# Patient Record
Sex: FEMALE | Race: WHITE | NOT HISPANIC OR LATINO | Employment: OTHER | URBAN - METROPOLITAN AREA
[De-identification: names, ages, dates, MRNs, and addresses within clinical notes are randomized per-mention and may not be internally consistent; named-entity substitution may affect disease eponyms.]

---

## 2017-01-12 ENCOUNTER — TELEPHONE (OUTPATIENT)
Dept: CARDIOLOGY | Facility: CLINIC | Age: 77
End: 2017-01-12

## 2017-01-12 NOTE — TELEPHONE ENCOUNTER
Pt is scheduled for RT total knee replacement with Dr. Flowers on 2/22/17. Alisson is requesting surgery clearance with any concerns are precautions. Please advise! Thanks, Jennifer

## 2017-05-31 RX ORDER — POTASSIUM CHLORIDE 750 MG/1
TABLET, EXTENDED RELEASE ORAL
Qty: 90 TABLET | Refills: 3 | Status: SHIPPED | OUTPATIENT
Start: 2017-05-31 | End: 2019-01-25 | Stop reason: ALTCHOICE

## 2017-06-09 ENCOUNTER — OFFICE VISIT (OUTPATIENT)
Dept: CARDIOLOGY | Facility: CLINIC | Age: 77
End: 2017-06-09

## 2017-06-09 VITALS
WEIGHT: 213.4 LBS | DIASTOLIC BLOOD PRESSURE: 78 MMHG | HEIGHT: 66 IN | SYSTOLIC BLOOD PRESSURE: 132 MMHG | BODY MASS INDEX: 34.3 KG/M2 | HEART RATE: 75 BPM

## 2017-06-09 DIAGNOSIS — E87.5 HYPERKALEMIA: ICD-10-CM

## 2017-06-09 DIAGNOSIS — M79.89 LIMB SWELLING: ICD-10-CM

## 2017-06-09 DIAGNOSIS — I49.1 ATRIAL COMPLEX, PREMATURE: Primary | ICD-10-CM

## 2017-06-09 DIAGNOSIS — I10 ESSENTIAL HYPERTENSION: ICD-10-CM

## 2017-06-09 PROCEDURE — 93000 ELECTROCARDIOGRAM COMPLETE: CPT | Performed by: INTERNAL MEDICINE

## 2017-06-09 PROCEDURE — 99213 OFFICE O/P EST LOW 20 MIN: CPT | Performed by: INTERNAL MEDICINE

## 2017-06-09 RX ORDER — DICLOFENAC SODIUM 75 MG/1
75 TABLET, DELAYED RELEASE ORAL 2 TIMES DAILY
COMMUNITY
End: 2018-06-08

## 2017-06-09 NOTE — PROGRESS NOTES
Subjective:     Encounter Date:06/09/2017      Patient ID: Pearl Desouza is a 76 y.o. female.    Chief Complaint:  History of Present Illness      Ms. Desouza is a 76-year-old female with a history of hypertension, hypothyroidism, frequent symptomatic PACs, and chronic lower extremity edema who presents for followup.  I saw the patient initially for an evaluation of palpitations.  A Holter monitor was placed which revealed episodes of sinus tachycardia with frequent PACs but no atrial fibrillation.  She underwent an echocardiogram which revealed normal left ventricular systolic function and wall motion, mild aortic insufficiency and mild tricuspid regurgitation.  We have been managing her palpitations and PACs with metoprolol which has helped greatly with her symptoms.  She has also been on chronic furosemide and potassium for her issues of lower extremity edema.  With weight loss and the use of metoprolol, her palpitations have greatly improved.      During our last visit she was having some issues with high potassium.  She tried cutting back on her potassium dosage but then started having more issues with palpitations.  At that time we decided she should take the potassium every other day.  Since then, she has had a repeat BMP done in 02/2017 which revealed her potassium was well controlled.  This was around the time of her knee replacement.  She reports that she has done well since her knee replacement.      Today she presents for routine followup.  She reports she has been under a lot of stress as she and her  have been having some issues.  They have been working these out and it is improving some.  Her palpitations overall have been pretty stable.  She reports that when she feels stressed or anxious, she will get a feeling in her chest kind of like a burning sensation.  She will usually take an extra potassium and the symptoms resolve.  It does not really seem to improve when she takes another  metoprolol.  She is not too concerned about the symptoms and relates this to her anxiety.  She reports that if she felt it was something concerning to do with her heart she would have notified me.  She otherwise denies any chest pain.  She denies any significant lower extremity edema, PND, orthopnea, presyncope or syncope.  She denies any significant shortness of breath.           Review of Systems   Constitution: Negative for weakness and malaise/fatigue.   HENT: Negative for headaches, hearing loss, hoarse voice, nosebleeds and sore throat.    Eyes: Negative for pain.   Cardiovascular: Positive for palpitations. Negative for chest pain, claudication, cyanosis, dyspnea on exertion, irregular heartbeat, leg swelling, near-syncope, orthopnea, paroxysmal nocturnal dyspnea and syncope.   Respiratory: Negative for shortness of breath and snoring.    Endocrine: Negative for cold intolerance, heat intolerance, polydipsia, polyphagia and polyuria.   Skin: Negative for itching and rash.   Musculoskeletal: Positive for joint pain. Negative for arthritis, falls, joint swelling, muscle cramps, muscle weakness and myalgias.   Gastrointestinal: Negative for constipation, diarrhea, dysphagia, heartburn, hematemesis, hematochezia, melena, nausea and vomiting.   Genitourinary: Negative for frequency, hematuria and hesitancy.   Neurological: Negative for excessive daytime sleepiness, dizziness, light-headedness and numbness.   Psychiatric/Behavioral: Negative for depression. The patient is not nervous/anxious.          Current Outpatient Prescriptions:   •  Ascorbic Acid (VITAMIN C) 500 MG capsule, Take  by mouth Daily., Disp: , Rfl:   •  aspirin 325 MG tablet, Take  by mouth 2 (Two) Times a Day., Disp: , Rfl:   •  B Complex Vitamins (VITAMIN B COMPLEX PO), Take  by mouth Daily., Disp: , Rfl:   •  Calcium Carbonate (CALCIUM 600 PO), Take  by mouth., Disp: , Rfl:   •  Cholecalciferol (VITAMIN D3) 5000 UNITS capsule capsule, Take  5,000 Units by mouth 2 (Two) Times a Day., Disp: , Rfl:   •  Coenzyme Q10 (CO Q-10 PO), Take 100 mg by mouth Daily., Disp: , Rfl:   •  diclofenac (VOLTAREN) 75 MG EC tablet, Take 75 mg by mouth 2 (Two) Times a Day., Disp: , Rfl:   •  DiphenhydrAMINE HCl (BENADRYL PO), Take  by mouth As Needed., Disp: , Rfl:   •  doxycycline (VIBRAMYCIN) 100 MG capsule, Take  by mouth every 12 (twelve) hours., Disp: , Rfl:   •  folic acid (FOLVITE) 1 MG tablet, Take 1 mg by mouth Daily., Disp: , Rfl:   •  furosemide (LASIX) 20 MG tablet, TAKE 1 TABLET BY MOUTH TWICE DAILY, Disp: 180 tablet, Rfl: 0  •  levothyroxine (SYNTHROID) 175 MCG tablet, Take  by mouth. Patietn takes one in the morning and 1/2 every night except for Monday and Thursday, Disp: , Rfl:   •  LUTEIN PO, Take  by mouth., Disp: , Rfl:   •  Magnesium 250 MG tablet, Take  by mouth Daily., Disp: , Rfl:   •  metFORMIN (GLUCOPHAGE) 500 MG tablet, Take  by mouth 2 (two) times a day., Disp: , Rfl:   •  metoprolol tartrate (LOPRESSOR) 25 MG tablet, Take 12.5 mg by mouth 3 (Three) Times a Day., Disp: , Rfl:   •  Misc Natural Products (OSTEO BI-FLEX JOINT SHIELD PO), Take  by mouth., Disp: , Rfl:   •  Multiple Vitamin (MULTI-VITAMIN PO), Take  by mouth Daily., Disp: , Rfl:   •  naproxen (NAPROSYN) 500 MG tablet, Take  by mouth As Needed., Disp: , Rfl:   •  niacinamide 500 MG tablet, Take 400 mg by mouth 2 (Two) Times a Day., Disp: , Rfl:   •  Omega-3 Fatty Acids (FISH OIL) 1000 MG capsule capsule, Take  by mouth every night at bedtime., Disp: , Rfl:   •  potassium chloride (K-DUR) 10 MEQ CR tablet, Take 1 tablet by mouth daily., Disp: 30 tablet, Rfl: 11  •  potassium chloride (K-DUR,KLOR-CON) 10 MEQ CR tablet, Take 1 tablet by mouth  daily, Disp: 90 tablet, Rfl: 3  •  Selenium (SELENIMIN-200 PO), Take  by mouth Daily., Disp: , Rfl:   •  Vitamin A 8000 UNITS tablet, Take  by mouth Daily., Disp: , Rfl:   •  vitamin E 400 UNIT/15ML liquid, Take  by mouth Daily., Disp: , Rfl:   •   "zolpidem (AMBIEN) 10 MG tablet, Take  by mouth every night at bedtime., Disp: , Rfl:     Past Medical History:   Diagnosis Date   • APC (adenomatous polyposis coli)    • Atrial premature complex    • Diabetes mellitus    • Fatigue    • Hypertension    • Hypothyroidism    • Limb swelling    • MVP (mitral valve prolapse)    • PAF (paroxysmal atrial fibrillation)    • Palpitation    • SOB (shortness of breath)      Past Surgical History:   Procedure Laterality Date   • HYSTERECTOMY     • KNEE SURGERY       Family History   Problem Relation Age of Onset   • Atrial fibrillation Mother    • Diabetes Other    • Stroke Neg Hx    • Hypertension Neg Hx      Social History   Substance Use Topics   • Smoking status: Never Smoker   • Smokeless tobacco: None      Comment: caffeine use   • Alcohol use Yes      Comment: Social           ECG 12 Lead  Date/Time: 6/9/2017 12:01 PM  Performed by: PETRA ATKINSON  Authorized by: PETRA ATKINSON   Comparison: compared with previous ECG   Similar to previous ECG  Rhythm: sinus rhythm  Conduction: right bundle branch block               Objective:         Visit Vitals   • /78   • Pulse 75   • Ht 66\" (167.6 cm)   • Wt 213 lb 6.4 oz (96.8 kg)   • BMI 34.44 kg/m2          Physical Exam   Constitutional: She is oriented to person, place, and time. She appears well-developed and well-nourished.   HENT:   Head: Normocephalic and atraumatic.   Eyes: Conjunctivae, EOM and lids are normal. Pupils are equal, round, and reactive to light.   Neck: Normal range of motion and full passive range of motion without pain. Neck supple. No JVD present. Carotid bruit is not present.   Cardiovascular: Normal rate, regular rhythm, S1 normal and S2 normal.  Exam reveals no gallop.    No murmur heard.  Pulses:       Radial pulses are 2+ on the right side, and 2+ on the left side.   No bilateral lower extremity edema   Pulmonary/Chest: Effort normal and breath sounds normal.   Abdominal: Soft. Normal " appearance.   Lymphadenopathy:     She has no cervical adenopathy.   Neurological: She is alert and oriented to person, place, and time.   Skin: Skin is warm, dry and intact.   Psychiatric: She has a normal mood and affect.       Lab Review:       Assessment:          Diagnosis Plan   1. Atrial complex, premature     2. Essential hypertension     3. Limb swelling     4. Hyperkalemia            Plan:        1. Palpitations/frequent PACs. Her symptoms have been pretty well controlled with electrolyte management with her potassium and metoprolol. We will continue the same.   2. Hypertension. Blood pressures look well controlled on her current medication. We will continue the same.   3. Dyslipidemia.     We will plan on seeing the patient back again in 1 year or sooner if any issues arise prior to that.

## 2017-10-16 DIAGNOSIS — M79.89 LIMB SWELLING: ICD-10-CM

## 2017-10-17 RX ORDER — FUROSEMIDE 20 MG/1
TABLET ORAL
Qty: 180 TABLET | Refills: 0 | Status: SHIPPED | OUTPATIENT
Start: 2017-10-17 | End: 2018-01-11 | Stop reason: SDUPTHER

## 2018-01-11 DIAGNOSIS — M79.89 LIMB SWELLING: ICD-10-CM

## 2018-01-11 RX ORDER — FUROSEMIDE 20 MG/1
TABLET ORAL
Qty: 180 TABLET | Refills: 1 | Status: SHIPPED | OUTPATIENT
Start: 2018-01-11 | End: 2018-04-10 | Stop reason: SDUPTHER

## 2018-04-10 DIAGNOSIS — M79.89 LIMB SWELLING: ICD-10-CM

## 2018-04-10 RX ORDER — FUROSEMIDE 20 MG/1
TABLET ORAL
Qty: 180 TABLET | Refills: 0 | Status: SHIPPED | OUTPATIENT
Start: 2018-04-10 | End: 2018-07-25 | Stop reason: SDUPTHER

## 2018-06-08 ENCOUNTER — OFFICE VISIT (OUTPATIENT)
Dept: CARDIOLOGY | Facility: CLINIC | Age: 78
End: 2018-06-08

## 2018-06-08 VITALS
WEIGHT: 215 LBS | SYSTOLIC BLOOD PRESSURE: 140 MMHG | HEIGHT: 66 IN | BODY MASS INDEX: 34.55 KG/M2 | DIASTOLIC BLOOD PRESSURE: 82 MMHG | HEART RATE: 63 BPM

## 2018-06-08 DIAGNOSIS — I10 ESSENTIAL HYPERTENSION: ICD-10-CM

## 2018-06-08 DIAGNOSIS — I49.1 ATRIAL COMPLEX, PREMATURE: Primary | ICD-10-CM

## 2018-06-08 DIAGNOSIS — M79.89 LIMB SWELLING: ICD-10-CM

## 2018-06-08 PROCEDURE — 93000 ELECTROCARDIOGRAM COMPLETE: CPT | Performed by: INTERNAL MEDICINE

## 2018-06-08 PROCEDURE — 99213 OFFICE O/P EST LOW 20 MIN: CPT | Performed by: INTERNAL MEDICINE

## 2018-06-08 NOTE — PROGRESS NOTES
Subjective:     Encounter Date:06/08/2018      Patient ID: Pearl Desouza is a 77 y.o. female.    Chief Complaint:  History of Present Illness    This is a 76-year-old female with a history of hypertension, hypothyroidism, frequent symptomatic PACs,  who presents for followup.      I saw the patient initially for an evaluation of palpitations.  A Holter monitor was placed which revealed episodes of sinus tachycardia with frequent PACs but no atrial fibrillation.  She underwent an echocardiogram which revealed normal left ventricular systolic function and wall motion, mild aortic insufficiency and mild tricuspid regurgitation.  We have been managing her palpitations and PACs with metoprolol which has helped greatly with her symptoms.  She has also been on chronic furosemide and potassium for her issues of lower extremity edema.  With weight loss and the use of metoprolol, her palpitations have greatly improved.       Today she presents for routine linear follow-up.  When I saw her last year she reported some increased issues with anxiety due to strain relationship with her .  The cause some increase in her  symptoms.  At that time however she reported that things were getting better and her symptoms were doing so also.  Today she reports that she is still having some issues with her  that caused her some anxiety and issues of depression.  She reports that he is becoming more paranoid and forgetful lately.  She suspects that he may be in early stages of dementia.  When she is feeling anxious her palpitations will increase and she will often take an additional half tablet of metoprolol tartrate.  Otherwise she's been able to really control her symptoms by taking the metoprolol tartrate twice a day.  She reports episodes when she wakes up in the middle the night feeling like something is wrong but she cannot pinpoint any other symptoms.  She denies any chest pain, feelings of shortness of  breath, or smothering with those episodes.  She does have heart racing and palpitations when she wakes up suddenly.  When these episodes occur she'll take an additional half tablet of metoprolol and sleep in her recliner.  She reports this happens about once or twice a week.  She does not believe this is due to sleep apnea.  He otherwise denies any chest pain, dyspnea on exertion, lower extremity edema, dizziness or lightheadedness.    Review of Systems   Constitution: Negative for weakness and malaise/fatigue.   HENT: Negative for hearing loss, hoarse voice, nosebleeds and sore throat.    Eyes: Negative for pain.   Cardiovascular: Positive for palpitations. Negative for chest pain, claudication, cyanosis, dyspnea on exertion, irregular heartbeat, leg swelling, near-syncope, orthopnea, paroxysmal nocturnal dyspnea and syncope.   Respiratory: Negative for shortness of breath and snoring.    Endocrine: Negative for cold intolerance, heat intolerance, polydipsia, polyphagia and polyuria.   Skin: Negative for itching and rash.   Musculoskeletal: Negative for arthritis, falls, joint pain, joint swelling, muscle cramps, muscle weakness and myalgias.   Gastrointestinal: Negative for constipation, diarrhea, dysphagia, heartburn, hematemesis, hematochezia, melena, nausea and vomiting.   Genitourinary: Negative for frequency, hematuria and hesitancy.   Neurological: Negative for excessive daytime sleepiness, dizziness, headaches, light-headedness and numbness.   Psychiatric/Behavioral: Positive for depression. The patient is nervous/anxious.           Current Outpatient Prescriptions:   •  Ascorbic Acid (VITAMIN C) 500 MG capsule, Take  by mouth Daily., Disp: , Rfl:   •  B Complex Vitamins (VITAMIN B COMPLEX PO), Take  by mouth Daily., Disp: , Rfl:   •  Calcium Carbonate (CALCIUM 600 PO), Take  by mouth., Disp: , Rfl:   •  Cholecalciferol (VITAMIN D3) 5000 UNITS capsule capsule, Take 5,000 Units by mouth 2 (Two) Times a Day.,  Disp: , Rfl:   •  Coenzyme Q10 (CO Q-10 PO), Take 100 mg by mouth Daily., Disp: , Rfl:   •  DiphenhydrAMINE HCl (BENADRYL PO), Take  by mouth As Needed., Disp: , Rfl:   •  doxycycline (VIBRAMYCIN) 100 MG capsule, Take  by mouth every 12 (twelve) hours., Disp: , Rfl:   •  folic acid (FOLVITE) 1 MG tablet, Take 1 mg by mouth Daily., Disp: , Rfl:   •  furosemide (LASIX) 20 MG tablet, TAKE 1 TABLET BY MOUTH TWO  TIMES DAILY, Disp: 180 tablet, Rfl: 0  •  levothyroxine (SYNTHROID) 175 MCG tablet, Take  by mouth. Patietn takes one in the morning and 1/2 every night except for Monday and Thursday, Disp: , Rfl:   •  LUTEIN PO, Take 20 mg by mouth Daily., Disp: , Rfl:   •  Magnesium 250 MG tablet, Take  by mouth Daily., Disp: , Rfl:   •  metFORMIN (GLUCOPHAGE) 500 MG tablet, Take  by mouth 2 (two) times a day., Disp: , Rfl:   •  metoprolol tartrate (LOPRESSOR) 25 MG tablet, TAKE 1/2 TABLET BY MOUTH THREE TIMES DAILY, Disp: 135 tablet, Rfl: 0  •  Misc Natural Products (OSTEO BI-FLEX JOINT SHIELD PO), Take  by mouth., Disp: , Rfl:   •  Multiple Vitamin (MULTI-VITAMIN PO), Take  by mouth Daily., Disp: , Rfl:   •  naproxen (NAPROSYN) 500 MG tablet, Take  by mouth As Needed., Disp: , Rfl:   •  niacinamide 500 MG tablet, Take 400 mg by mouth 2 (Two) Times a Day., Disp: , Rfl:   •  Omega-3 Fatty Acids (FISH OIL) 1000 MG capsule capsule, Take  by mouth every night at bedtime., Disp: , Rfl:   •  potassium chloride (K-DUR) 10 MEQ CR tablet, Take 1 tablet by mouth daily., Disp: 30 tablet, Rfl: 11  •  potassium chloride (K-DUR,KLOR-CON) 10 MEQ CR tablet, Take 1 tablet by mouth  daily, Disp: 90 tablet, Rfl: 3  •  Selenium (SELENIMIN-200 PO), Take  by mouth Daily., Disp: , Rfl:   •  Vitamin A 8000 UNITS tablet, Take  by mouth Daily., Disp: , Rfl:   •  vitamin E 400 UNIT/15ML liquid, Take  by mouth Daily., Disp: , Rfl:   •  zolpidem (AMBIEN) 10 MG tablet, Take  by mouth every night at bedtime., Disp: , Rfl:     Past Medical History:  "  Diagnosis Date   • APC (adenomatous polyposis coli)    • Atrial premature complex    • Diabetes mellitus    • Fatigue    • Hypertension    • Hypothyroidism    • Limb swelling    • MVP (mitral valve prolapse)    • PAF (paroxysmal atrial fibrillation)    • Palpitation    • SOB (shortness of breath)      Past Surgical History:   Procedure Laterality Date   • HYSTERECTOMY     • KNEE SURGERY       Family History   Problem Relation Age of Onset   • Atrial fibrillation Mother    • Diabetes Other    • Stroke Neg Hx    • Hypertension Neg Hx      Social History   Substance Use Topics   • Smoking status: Never Smoker   • Smokeless tobacco: Not on file      Comment: caffeine use   • Alcohol use Yes      Comment: Social           ECG 12 Lead  Date/Time: 6/8/2018 11:13 AM  Performed by: PETRA ATKINSON  Authorized by: PETRA ATKINSON   Comparison: compared with previous ECG   Similar to previous ECG  Rhythm: sinus rhythm  Conduction: right bundle branch block and LAFB               Objective:         Visit Vitals  /82   Pulse 63   Ht 167.6 cm (66\")   Wt 97.5 kg (215 lb)   BMI 34.70 kg/m²          Physical Exam   Constitutional: She is oriented to person, place, and time. She appears well-developed and well-nourished.   HENT:   Head: Normocephalic and atraumatic.   Eyes: Conjunctivae, EOM and lids are normal. Pupils are equal, round, and reactive to light.   Neck: Normal range of motion and full passive range of motion without pain. Neck supple. No JVD present. Carotid bruit is not present.   Cardiovascular: Normal rate, regular rhythm, S1 normal and S2 normal.  Exam reveals no gallop.    No murmur heard.  Pulses:       Radial pulses are 2+ on the right side, and 2+ on the left side.   No bilateral lower extremity edema   Pulmonary/Chest: Effort normal and breath sounds normal.   Abdominal: Soft. Normal appearance.   Lymphadenopathy:     She has no cervical adenopathy.   Neurological: She is alert and oriented to person, " place, and time.   Skin: Skin is warm, dry and intact.   Psychiatric: She has a normal mood and affect.       Lab Review:       Assessment:          Diagnosis Plan   1. Atrial complex, premature  ECG 12 Lead   2. Essential hypertension  ECG 12 Lead   3. Limb swelling            Plan:       1.  Premature atrial contractions.  Overall this is been well controlled on low-dose metoprolol tartrate.    2.  Hypertension.  Well-controlled on low-dose metoprolol.  3.  Chronic lower extremity edema.  Well-controlled on current dose of furosemide.    We'll plan on seeing the patient again in one year or sooner if any further issues arise.

## 2018-07-25 DIAGNOSIS — M79.89 LIMB SWELLING: ICD-10-CM

## 2018-07-25 RX ORDER — FUROSEMIDE 20 MG/1
20 TABLET ORAL 2 TIMES DAILY
Qty: 180 TABLET | Refills: 0 | Status: SHIPPED | OUTPATIENT
Start: 2018-07-25 | End: 2018-11-12 | Stop reason: SDUPTHER

## 2018-11-12 DIAGNOSIS — M79.89 LIMB SWELLING: ICD-10-CM

## 2018-11-12 RX ORDER — FUROSEMIDE 20 MG/1
20 TABLET ORAL 2 TIMES DAILY
Qty: 180 TABLET | Refills: 0 | Status: SHIPPED | OUTPATIENT
Start: 2018-11-12 | End: 2019-03-01 | Stop reason: SDUPTHER

## 2019-01-25 ENCOUNTER — TELEPHONE (OUTPATIENT)
Dept: CARDIOLOGY | Facility: CLINIC | Age: 79
End: 2019-01-25

## 2019-01-25 DIAGNOSIS — M79.89 LIMB SWELLING: ICD-10-CM

## 2019-01-25 RX ORDER — POTASSIUM CHLORIDE 750 MG/1
10 CAPSULE, EXTENDED RELEASE ORAL DAILY
Qty: 90 CAPSULE | Refills: 1 | Status: SHIPPED | OUTPATIENT
Start: 2019-01-25 | End: 2019-02-01 | Stop reason: SDUPTHER

## 2019-01-25 RX ORDER — POTASSIUM CHLORIDE 750 MG/1
10 CAPSULE, EXTENDED RELEASE ORAL 2 TIMES DAILY
COMMUNITY
End: 2019-01-25 | Stop reason: SDUPTHER

## 2019-01-25 NOTE — TELEPHONE ENCOUNTER
Pt called requesting an appointment to see Dr. jacobsen at [facility] office due to Anxiety attacks pt scheduled for 2/1/19. Per Dr. Jacobsen. Thanks, Jennifer

## 2019-02-01 ENCOUNTER — OFFICE VISIT (OUTPATIENT)
Dept: CARDIOLOGY | Facility: CLINIC | Age: 79
End: 2019-02-01

## 2019-02-01 VITALS
BODY MASS INDEX: 31.63 KG/M2 | HEART RATE: 58 BPM | DIASTOLIC BLOOD PRESSURE: 86 MMHG | OXYGEN SATURATION: 99 % | SYSTOLIC BLOOD PRESSURE: 134 MMHG | WEIGHT: 196.8 LBS | HEIGHT: 66 IN

## 2019-02-01 DIAGNOSIS — E87.5 HYPERKALEMIA: ICD-10-CM

## 2019-02-01 DIAGNOSIS — I49.1 ATRIAL COMPLEX, PREMATURE: ICD-10-CM

## 2019-02-01 DIAGNOSIS — M79.89 LIMB SWELLING: ICD-10-CM

## 2019-02-01 DIAGNOSIS — E87.6 HYPOKALEMIA: Primary | ICD-10-CM

## 2019-02-01 DIAGNOSIS — I10 ESSENTIAL HYPERTENSION: ICD-10-CM

## 2019-02-01 PROCEDURE — 93000 ELECTROCARDIOGRAM COMPLETE: CPT | Performed by: INTERNAL MEDICINE

## 2019-02-01 PROCEDURE — 99213 OFFICE O/P EST LOW 20 MIN: CPT | Performed by: INTERNAL MEDICINE

## 2019-02-01 RX ORDER — MELOXICAM 15 MG/1
15 TABLET ORAL DAILY
COMMUNITY
End: 2019-06-07

## 2019-02-01 RX ORDER — DICLOFENAC SODIUM 75 MG/1
75 TABLET, DELAYED RELEASE ORAL 2 TIMES DAILY
Refills: 0 | COMMUNITY
Start: 2018-11-10 | End: 2019-06-07

## 2019-02-01 RX ORDER — POTASSIUM CHLORIDE 750 MG/1
10 CAPSULE, EXTENDED RELEASE ORAL DAILY
Qty: 90 CAPSULE | Refills: 3 | Status: SHIPPED | OUTPATIENT
Start: 2019-02-01

## 2019-02-01 NOTE — PROGRESS NOTES
Subjective:     Encounter Date:02/01/2019      Patient ID: Pearl Desouza is a 78 y.o. female.    Chief Complaint:  History of Present Illness    This is a 78-year-old female with a history of hypertension, hypothyroidism, frequent symptomatic PACs,  who presents for followup.       I saw the patient initially for an evaluation of palpitations.  A Holter monitor was placed which revealed episodes of sinus tachycardia with frequent PACs but no atrial fibrillation.  She underwent an echocardiogram which revealed normal left ventricular systolic function and wall motion, mild aortic insufficiency and mild tricuspid regurgitation.  We have been managing her palpitations and PACs with metoprolol which has helped greatly with her symptoms.  She has also been on chronic furosemide and potassium for her issues of lower extremity edema.  With weight loss and the use of metoprolol, her palpitations have greatly improved.       When I saw her last for routine follow up in 6/2018 she reported increased issues with anxiety due to a strained relationship with her .  She suspected that he may be in early stages of dementia.  When she is felt  anxious her palpitations would increase and she would often take an additional half tablet of metoprolol tartrate. Otherwise she had been able to really control her symptoms by taking the metoprolol tartrate twice a day.      Today she presents for urgent follow up.  She called earlier this week complaining of worsening palpitations.  She has been undergoing work up for abdominal discomfort including a CT of the abdomen/pelvis last week and endoscopy yesterday.  She reports she has been on a liquid diet on and off for her work up.  Last week following her CT she presented to the emergency room with increased palpitations.  Her work up was unremarkable except for hypokalemia.  She admits that she did not take her meds the morning before her CT except for metoprolol.  She also  reports she has been feeling more tired lately but attributes this to liquid diet.  She is feeling better now that she had a regular meal last night.  She denies any chest pain, dyspnea, near syncope or syncope, PND or orthopnea.       Review of Systems   Constitution: Positive for malaise/fatigue. Negative for weakness.   HENT: Negative for hearing loss, hoarse voice, nosebleeds and sore throat.    Eyes: Negative for pain.   Cardiovascular: Positive for palpitations. Negative for chest pain, claudication, cyanosis, dyspnea on exertion, irregular heartbeat, leg swelling, near-syncope, orthopnea, paroxysmal nocturnal dyspnea and syncope.   Respiratory: Negative for shortness of breath and snoring.    Endocrine: Negative for cold intolerance, heat intolerance, polydipsia, polyphagia and polyuria.   Skin: Negative for itching and rash.   Musculoskeletal: Negative for arthritis, falls, joint pain, joint swelling, muscle cramps, muscle weakness and myalgias.   Gastrointestinal: Negative for constipation, diarrhea, dysphagia, heartburn, hematemesis, hematochezia, melena, nausea and vomiting.   Genitourinary: Negative for frequency, hematuria and hesitancy.   Neurological: Negative for excessive daytime sleepiness, dizziness, headaches, light-headedness and numbness.   Psychiatric/Behavioral: Negative for depression. The patient is not nervous/anxious.           Current Outpatient Medications:   •  Ascorbic Acid (VITAMIN C) 500 MG capsule, Take  by mouth Daily., Disp: , Rfl:   •  B Complex Vitamins (VITAMIN B COMPLEX PO), Take  by mouth Daily., Disp: , Rfl:   •  Calcium Carbonate (CALCIUM 600 PO), Take  by mouth., Disp: , Rfl:   •  Cholecalciferol (VITAMIN D3) 5000 UNITS capsule capsule, Take 5,000 Units by mouth 2 (Two) Times a Day., Disp: , Rfl:   •  Coenzyme Q10 (CO Q-10 PO), Take 100 mg by mouth Daily., Disp: , Rfl:   •  diclofenac (VOLTAREN) 75 MG EC tablet, Take 75 mg by mouth 2 (Two) Times a Day., Disp: , Rfl: 0  •   DiphenhydrAMINE HCl (BENADRYL PO), Take  by mouth As Needed., Disp: , Rfl:   •  doxycycline (VIBRAMYCIN) 100 MG capsule, Take  by mouth every 12 (twelve) hours., Disp: , Rfl:   •  Folic Acid 0.8 MG capsule, Take 0.8 mg by mouth Daily., Disp: , Rfl:   •  furosemide (LASIX) 20 MG tablet, Take 1 tablet by mouth 2 (Two) Times a Day., Disp: 180 tablet, Rfl: 0  •  levothyroxine (SYNTHROID) 175 MCG tablet, Take  by mouth. Patietn takes one in the morning and 1/2 every night except for Monday and Thursday, Disp: , Rfl:   •  LUTEIN PO, Take 20 mg by mouth Daily., Disp: , Rfl:   •  Magnesium 250 MG tablet, Take  by mouth Daily., Disp: , Rfl:   •  meloxicam (MOBIC) 15 MG tablet, Take 15 mg by mouth Daily., Disp: , Rfl:   •  metFORMIN (GLUCOPHAGE) 500 MG tablet, Take  by mouth 2 (two) times a day., Disp: , Rfl:   •  metoprolol tartrate (LOPRESSOR) 25 MG tablet, TAKE 1/2 TABLET BY MOUTH THREE TIMES DAILY, Disp: 135 tablet, Rfl: 0  •  Misc Natural Products (OSTEO BI-FLEX JOINT SHIELD PO), Take  by mouth., Disp: , Rfl:   •  Multiple Vitamin (MULTI-VITAMIN PO), Take  by mouth Daily., Disp: , Rfl:   •  naproxen (NAPROSYN) 500 MG tablet, Take  by mouth As Needed., Disp: , Rfl:   •  niacinamide 500 MG tablet, Take 500 mg by mouth 2 (Two) Times a Day., Disp: , Rfl:   •  Omega-3 Fatty Acids (FISH OIL) 1000 MG capsule capsule, Take  by mouth every night at bedtime., Disp: , Rfl:   •  potassium chloride (MICRO-K) 10 MEQ CR capsule, Take 1 capsule by mouth Daily., Disp: 90 capsule, Rfl: 3  •  Selenium (SELENIMIN-200 PO), Take  by mouth Daily., Disp: , Rfl:   •  Vitamin A 8000 UNITS tablet, Take  by mouth Daily., Disp: , Rfl:   •  vitamin E 400 UNIT/15ML liquid, Take  by mouth Daily., Disp: , Rfl:   •  zolpidem (AMBIEN) 10 MG tablet, Take  by mouth every night at bedtime., Disp: , Rfl:     Past Medical History:   Diagnosis Date   • APC (adenomatous polyposis coli)    • Atrial premature complex    • Diabetes mellitus (CMS/HCC)    • Fatigue   "  • Hypertension    • Hypothyroidism    • Limb swelling    • MVP (mitral valve prolapse)    • PAF (paroxysmal atrial fibrillation) (CMS/HCC)    • Palpitation    • SOB (shortness of breath)      Past Surgical History:   Procedure Laterality Date   • HYSTERECTOMY     • KNEE SURGERY       Family History   Problem Relation Age of Onset   • Atrial fibrillation Mother    • Diabetes Other    • Stroke Neg Hx    • Hypertension Neg Hx      Social History     Tobacco Use   • Smoking status: Never Smoker   • Tobacco comment: caffeine use   Substance Use Topics   • Alcohol use: Yes     Comment: Social   • Drug use: Not on file           ECG 12 Lead  Date/Time: 2/1/2019 5:16 PM  Performed by: Karen Jacobsen MD  Authorized by: Karen Jacobsen MD   Comparison: compared with previous ECG   Rhythm: sinus rhythm  Conduction: right bundle branch block and LAFB               Objective:         Visit Vitals  /86 (BP Location: Left arm, Patient Position: Sitting, Cuff Size: Large Adult)   Pulse 58   Ht 167.6 cm (66\")   Wt 89.3 kg (196 lb 12.8 oz)   SpO2 99%   BMI 31.76 kg/m²          Physical Exam   Constitutional: She is oriented to person, place, and time. She appears well-developed and well-nourished.   HENT:   Head: Normocephalic and atraumatic.   Eyes: Conjunctivae, EOM and lids are normal. Pupils are equal, round, and reactive to light.   Neck: Normal range of motion and full passive range of motion without pain. Neck supple. No JVD present. Carotid bruit is not present.   Cardiovascular: Normal rate, regular rhythm, S1 normal and S2 normal. Exam reveals no gallop.   No murmur heard.  Pulses:       Radial pulses are 2+ on the right side, and 2+ on the left side.   No bilateral lower extremity edema   Pulmonary/Chest: Effort normal and breath sounds normal.   Abdominal: Soft. Normal appearance.   Lymphadenopathy:     She has no cervical adenopathy.   Neurological: She is alert and oriented to person, place, and time.   Skin: " Skin is warm, dry and intact.   Psychiatric: She has a normal mood and affect.       Lab Review:       Assessment:          Diagnosis Plan   1. Hypokalemia  potassium chloride (MICRO-K) 10 MEQ CR capsule   2. Atrial complex, premature     3. Essential hypertension     4. Limb swelling     5. Hyperkalemia            Plan:       1. Palpitations.  Recent episode likely related to hypokalemia.  Resolved since taking medications regularly.   2. Premature atrial contractions. Controlled with metoprolol.   3. Hypertension. Well controlled.   4. Chronic lower extremity edema.  Controlled with furosemide.     Will see her back in 4-6 months.

## 2019-02-19 ENCOUNTER — TELEPHONE (OUTPATIENT)
Dept: CARDIOLOGY | Facility: CLINIC | Age: 79
End: 2019-02-19

## 2019-02-19 NOTE — TELEPHONE ENCOUNTER
02/19/19  8:56 AM  Pearl Desouza  1940    Home Phone [phone]       Pearlkhadar Desouza is a patient of Dr. Jacobsen, calling in needing cardiac clearance and for Dr. Jacobsen to call Dr. Rosenberg re: her upcoming lap [facility]. PH: [phone]. Apparently, there is an enlargement of an artery close by that they want to discuss. Also, I scheduled her an appt w/Dr. Jacobsen for cardiac clearance on 3/1/19 at [place].    Please call Dr. Rosenberg at your convenience.    Imelda Haynes RN

## 2019-02-22 NOTE — TELEPHONE ENCOUNTER
I called the Dr. Rosenberg's office yesterday and gave the office my cell phone number for a call back since she was in the OR at that time.

## 2019-03-01 ENCOUNTER — OFFICE VISIT (OUTPATIENT)
Dept: CARDIOLOGY | Facility: CLINIC | Age: 79
End: 2019-03-01

## 2019-03-01 VITALS
WEIGHT: 192.6 LBS | DIASTOLIC BLOOD PRESSURE: 82 MMHG | BODY MASS INDEX: 30.95 KG/M2 | HEIGHT: 66 IN | SYSTOLIC BLOOD PRESSURE: 132 MMHG | HEART RATE: 74 BPM | OXYGEN SATURATION: 98 %

## 2019-03-01 DIAGNOSIS — K80.21 CALCULUS OF GALLBLADDER WITH BILIARY OBSTRUCTION BUT WITHOUT CHOLECYSTITIS: ICD-10-CM

## 2019-03-01 DIAGNOSIS — M79.89 LIMB SWELLING: ICD-10-CM

## 2019-03-01 DIAGNOSIS — I10 ESSENTIAL HYPERTENSION: ICD-10-CM

## 2019-03-01 DIAGNOSIS — I27.20 PULMONARY HYPERTENSION (HCC): ICD-10-CM

## 2019-03-01 DIAGNOSIS — I49.1 ATRIAL COMPLEX, PREMATURE: Primary | ICD-10-CM

## 2019-03-01 PROCEDURE — 99214 OFFICE O/P EST MOD 30 MIN: CPT | Performed by: INTERNAL MEDICINE

## 2019-03-01 PROCEDURE — 93000 ELECTROCARDIOGRAM COMPLETE: CPT | Performed by: INTERNAL MEDICINE

## 2019-03-01 RX ORDER — FUROSEMIDE 20 MG/1
TABLET ORAL
Qty: 180 TABLET | Refills: 2 | Status: SHIPPED | OUTPATIENT
Start: 2019-03-01

## 2019-03-01 NOTE — PROGRESS NOTES
Subjective:     Encounter Date:03/01/2019      Patient ID: Pearl Desouza is a 78 y.o. female.    Chief Complaint:  History of Present Illness    This is a 78-year-old female with a history of hypertension, hypothyroidism, frequent symptomatic PACs,  who presents for followup.       I saw the patient initially for an evaluation of palpitations.  A Holter monitor was placed which revealed episodes of sinus tachycardia with frequent PACs but no atrial fibrillation.  She underwent an echocardiogram which revealed normal left ventricular systolic function and wall motion, mild aortic insufficiency and mild tricuspid regurgitation.  We have been managing her palpitations and PACs with metoprolol which has helped greatly with her symptoms.  She has also been on chronic furosemide and potassium for her issues of lower extremity edema.  With weight loss and the use of metoprolol, her palpitations have greatly improved.       On 2/2/2019 she presented for urgent follow up.  She called earlier that week complaining of worsening palpitations.  She had been undergoing work up for abdominal discomfort including a CT of the abdomen/pelvis last week and endoscopy the day prior.  She reports she has been on a liquid diet on and off for her work up.   Following her CT she presented to the emergency room with increased palpitations.  Her work up was unremarkable except for hypokalemia.  She admits that she did not take her meds the morning before her CT except for metoprolol.  She also reports she has been feeling more tired lately but attributes this to liquid diet.  She is feeling better now that she had a regular meal last night.    Make any changes to her management and office visit.    Today she presents for preoperative evaluation.  She is to see Dr. Rosenberg for cholelithiasis and a possible lap cholecystectomy.  It was recommended that she get cardiac clearance due to the note made by the radiologist on her CT of the  abdomen and pelvis that indicated that her pulmonary artery appear to be enlarged and could be a sign of pulmonary hypertension.  Patient reports that since she was here last she has been feeling really well.  She switch the type of potassium she has been using and feels much better on it.  She has had no further palpitations.  She denies any chest pain, shortness of breath PND or orthopnea, near-syncope or syncope, or lower extremity edema.        Review of Systems   Constitution: Negative for weakness and malaise/fatigue.   HENT: Negative for hearing loss, hoarse voice, nosebleeds and sore throat.    Eyes: Negative for pain.   Cardiovascular: Negative for chest pain, claudication, cyanosis, dyspnea on exertion, irregular heartbeat, leg swelling, near-syncope, orthopnea, palpitations, paroxysmal nocturnal dyspnea and syncope.   Respiratory: Negative for shortness of breath and snoring.    Endocrine: Negative for cold intolerance, heat intolerance, polydipsia, polyphagia and polyuria.   Skin: Negative for itching and rash.   Musculoskeletal: Negative for arthritis, falls, joint pain, joint swelling, muscle cramps, muscle weakness and myalgias.   Gastrointestinal: Negative for constipation, diarrhea, dysphagia, heartburn, hematemesis, hematochezia, melena, nausea and vomiting.   Genitourinary: Negative for frequency, hematuria and hesitancy.   Neurological: Negative for excessive daytime sleepiness, dizziness, headaches, light-headedness and numbness.   Psychiatric/Behavioral: Negative for depression. The patient is not nervous/anxious.           Current Outpatient Medications:   •  Ascorbic Acid (VITAMIN C) 500 MG capsule, Take  by mouth Daily., Disp: , Rfl:   •  B Complex Vitamins (VITAMIN B COMPLEX PO), Take  by mouth Daily., Disp: , Rfl:   •  Calcium Carbonate (CALCIUM 600 PO), Take  by mouth., Disp: , Rfl:   •  Cholecalciferol (VITAMIN D3) 5000 UNITS capsule capsule, Take 5,000 Units by mouth 2 (Two) Times a  Day., Disp: , Rfl:   •  Coenzyme Q10 (CO Q-10 PO), Take 100 mg by mouth Daily., Disp: , Rfl:   •  diclofenac (VOLTAREN) 75 MG EC tablet, Take 75 mg by mouth 2 (Two) Times a Day., Disp: , Rfl: 0  •  DiphenhydrAMINE HCl (BENADRYL PO), Take  by mouth As Needed., Disp: , Rfl:   •  doxycycline (VIBRAMYCIN) 100 MG capsule, Take  by mouth every 12 (twelve) hours., Disp: , Rfl:   •  Folic Acid 0.8 MG capsule, Take 0.8 mg by mouth Daily., Disp: , Rfl:   •  furosemide (LASIX) 20 MG tablet, Take 1 tablet by mouth 2 (Two) Times a Day., Disp: 180 tablet, Rfl: 0  •  levothyroxine (SYNTHROID) 175 MCG tablet, Take  by mouth. Patietn takes one in the morning and 1/2 every night except for Monday and Thursday, Disp: , Rfl:   •  LUTEIN PO, Take 20 mg by mouth Daily., Disp: , Rfl:   •  Magnesium 250 MG tablet, Take  by mouth Daily., Disp: , Rfl:   •  meloxicam (MOBIC) 15 MG tablet, Take 15 mg by mouth Daily., Disp: , Rfl:   •  metFORMIN (GLUCOPHAGE) 500 MG tablet, Take  by mouth 2 (two) times a day., Disp: , Rfl:   •  metoprolol tartrate (LOPRESSOR) 25 MG tablet, TAKE 1/2 TABLET BY MOUTH THREE TIMES DAILY, Disp: 135 tablet, Rfl: 0  •  Misc Natural Products (OSTEO BI-FLEX JOINT SHIELD PO), Take  by mouth., Disp: , Rfl:   •  Multiple Vitamin (MULTI-VITAMIN PO), Take  by mouth Daily., Disp: , Rfl:   •  naproxen (NAPROSYN) 500 MG tablet, Take  by mouth As Needed., Disp: , Rfl:   •  niacinamide 500 MG tablet, Take 500 mg by mouth 2 (Two) Times a Day., Disp: , Rfl:   •  Omega-3 Fatty Acids (FISH OIL) 1000 MG capsule capsule, Take  by mouth every night at bedtime., Disp: , Rfl:   •  potassium chloride (MICRO-K) 10 MEQ CR capsule, Take 1 capsule by mouth Daily., Disp: 90 capsule, Rfl: 3  •  Selenium (SELENIMIN-200 PO), Take  by mouth Daily., Disp: , Rfl:   •  Vitamin A 8000 UNITS tablet, Take  by mouth Daily., Disp: , Rfl:   •  vitamin E 400 UNIT/15ML liquid, Take  by mouth Daily., Disp: , Rfl:   •  zolpidem (AMBIEN) 10 MG tablet, Take  by  "mouth every night at bedtime., Disp: , Rfl:     Past Medical History:   Diagnosis Date   • APC (adenomatous polyposis coli)    • Atrial premature complex    • Diabetes mellitus (CMS/HCC)    • Fatigue    • Hypertension    • Hypothyroidism    • Limb swelling    • MVP (mitral valve prolapse)    • PAF (paroxysmal atrial fibrillation) (CMS/HCC)    • Palpitation    • SOB (shortness of breath)      Past Surgical History:   Procedure Laterality Date   • HYSTERECTOMY     • KNEE SURGERY       Family History   Problem Relation Age of Onset   • Atrial fibrillation Mother    • Diabetes Other    • Stroke Neg Hx    • Hypertension Neg Hx      Social History     Tobacco Use   • Smoking status: Never Smoker   • Smokeless tobacco: Never Used   • Tobacco comment: caffeine use   Substance Use Topics   • Alcohol use: Yes     Comment: Social   • Drug use: Not on file           ECG 12 Lead  Date/Time: 3/1/2019 12:59 PM  Performed by: Karen Jacobsen MD  Authorized by: Karen Jacobsen MD   Comparison: compared with previous ECG   Similar to previous ECG  Rhythm: sinus rhythm  Conduction: right bundle branch block               Objective:         Visit Vitals  /82 (BP Location: Left arm, Patient Position: Sitting, Cuff Size: Large Adult)   Pulse 74   Ht 167.6 cm (66\")   Wt 87.4 kg (192 lb 9.6 oz)   SpO2 98%   BMI 31.09 kg/m²          Physical Exam   Constitutional: She is oriented to person, place, and time. She appears well-developed and well-nourished.   HENT:   Head: Normocephalic and atraumatic.   Eyes: Conjunctivae, EOM and lids are normal. Pupils are equal, round, and reactive to light.   Neck: Normal range of motion and full passive range of motion without pain. Neck supple. No JVD present. Carotid bruit is not present.   Cardiovascular: Normal rate, regular rhythm, S1 normal and S2 normal. Exam reveals no gallop.   No murmur heard.  Pulses:       Radial pulses are 2+ on the right side, and 2+ on the left side.   No bilateral " lower extremity edema   Pulmonary/Chest: Effort normal and breath sounds normal.   Abdominal: Soft. Normal appearance.   Lymphadenopathy:     She has no cervical adenopathy.   Neurological: She is alert and oriented to person, place, and time.   Skin: Skin is warm, dry and intact.   Psychiatric: She has a normal mood and affect.       Lab Review:       Assessment:          Diagnosis Plan   1. Atrial complex, premature     2. Essential hypertension     3. Calculus of gallbladder with biliary obstruction but without cholecystitis     4. Pulmonary hypertension (CMS/HCC)  Adult Transthoracic Echo Complete W/ Cont if Necessary Per Protocol          Plan:       1.  Possible pulmonary artery enlargement.  I have a low suspicion for pulmonary hypertension but due to her upcoming surgery I will set her up for an echocardiogram to evaluate for any evidence of pulmonary hypertension.  I discussed this with Dr. Rosenberg.  2.  Premature atrial contractions.  None present on her EKG today.  She reports improvement in her symptoms since changing her potassium regimen.  3.  Hypertension.  Well controlled.  4.  Chronic lower extremity edema.  Controlled with furosemide.  5.  Cholelithiasis.  She has follow-up and workup with Dr. Rosenberg.    To the echocardiogram results are available.  I will see her otherwise back in follow-up as already scheduled.

## 2019-03-04 ENCOUNTER — TELEPHONE (OUTPATIENT)
Dept: CARDIOLOGY | Facility: CLINIC | Age: 79
End: 2019-03-04

## 2019-03-04 NOTE — TELEPHONE ENCOUNTER
Patient called wanting to let you know that she is now scheduled at [facility] for her echo on 03/06/2019 at 1:00 pm    Vicki    [phone]

## 2019-03-19 ENCOUNTER — TELEPHONE (OUTPATIENT)
Dept: CARDIOLOGY | Facility: CLINIC | Age: 79
End: 2019-03-19

## 2019-03-19 NOTE — TELEPHONE ENCOUNTER
[place] w/Dr. Rosenberg's ofc calling regarding surgical clearance. Patient is scheduled for Laparoscopic Cholecystectomy on 3/29/19 w/Dr. Rosenberg. Patient was last seen 3/1 and had a normal Echo on 3/6. Please advise if cleared for procedure.    Fax # [phone]

## 2019-04-09 RX ORDER — POTASSIUM CHLORIDE 750 MG/1
10 CAPSULE, EXTENDED RELEASE ORAL DAILY
Qty: 90 CAPSULE | Refills: 1 | Status: SHIPPED | OUTPATIENT
Start: 2019-04-09 | End: 2019-06-07

## 2019-05-03 ENCOUNTER — TELEPHONE (OUTPATIENT)
Dept: CARDIOLOGY | Facility: HOSPITAL | Age: 79
End: 2019-05-03

## 2019-05-03 NOTE — TELEPHONE ENCOUNTER
05/03/19  11:43 AM  Pearl Desouza  1940  Home Phone [phone]       Pearl Desouza is a pt of  last seen in office 3/1/2019. She has HX of HTN, hypothyroidism, symptomatic PAC's, and new hypokalemia.  She calls in today with some concerns regarding her potassium medications. During her last few hospital visits, she was diagnosed with gastroenteritis, and hypokalemia. The most recent potassium level was 3.2. The doctor treating at the time (Dr. Mcduffie) doubled up on her potassium. Ms Desouza is concerned that this is too much medication for her. She says her heart rate is a bit weaker than normal for her ranging from 55-mid 60's while walking and resting. Her BP ranges from 98/48 to 130/69. She says she is unsure if she Is taking it correctly or not. She is also experiencing dizziness and a little bit of confusion She is also experiencing some SOA on exertion. She denies chest pain. She wants to know if she should be seen sooner than her appointment on 6/7 and also if the new dose of potassium is too much for her. She says she would like Dr. Jacobsen's opinion as she has been established with her for many years. Pt can be reached at [phone].      Thanks     SW

## 2019-05-03 NOTE — TELEPHONE ENCOUNTER
I called and spoke with the patient. She is short of breath speaking with me on the phone.  Able to drink water but unable to keep any food down.  She was in the hospital for 2 days last week for nausea and vomiting.  Recommended that she go to the emergency room.

## 2019-06-07 ENCOUNTER — OFFICE VISIT (OUTPATIENT)
Dept: CARDIOLOGY | Facility: CLINIC | Age: 79
End: 2019-06-07

## 2019-06-07 VITALS
HEIGHT: 66 IN | DIASTOLIC BLOOD PRESSURE: 58 MMHG | BODY MASS INDEX: 26.68 KG/M2 | HEART RATE: 70 BPM | SYSTOLIC BLOOD PRESSURE: 112 MMHG | WEIGHT: 166 LBS

## 2019-06-07 DIAGNOSIS — I10 ESSENTIAL HYPERTENSION: ICD-10-CM

## 2019-06-07 DIAGNOSIS — I49.3 PVC (PREMATURE VENTRICULAR CONTRACTION): ICD-10-CM

## 2019-06-07 DIAGNOSIS — M79.89 LIMB SWELLING: ICD-10-CM

## 2019-06-07 DIAGNOSIS — I49.1 ATRIAL COMPLEX, PREMATURE: Primary | ICD-10-CM

## 2019-06-07 PROCEDURE — 99214 OFFICE O/P EST MOD 30 MIN: CPT | Performed by: INTERNAL MEDICINE

## 2019-06-07 PROCEDURE — 93000 ELECTROCARDIOGRAM COMPLETE: CPT | Performed by: INTERNAL MEDICINE

## 2019-06-07 RX ORDER — BLOOD SUGAR DIAGNOSTIC
STRIP MISCELLANEOUS 2 TIMES DAILY
Refills: 2 | COMMUNITY
Start: 2019-04-22

## 2019-06-07 NOTE — PROGRESS NOTES
Subjective:     Encounter Date:06/07/2019      Patient ID: Pearl Desouza is a 78 y.o. female.    Chief Complaint:  History of Present Illness    This is a 78-year-old female with a history of hypertension, hypothyroidism, frequent symptomatic PACs, who presents for followup.       I saw the patient initially for an evaluation of palpitations.  A Holter monitor was placed which revealed episodes of sinus tachycardia with frequent PACs but no atrial fibrillation.  She underwent an echocardiogram which revealed normal left ventricular systolic function and wall motion, mild aortic insufficiency and mild tricuspid regurgitation.  We have been managing her palpitations and PACs with metoprolol which has helped greatly with her symptoms.  She has also been on chronic furosemide and potassium for her issues of lower extremity edema.  With weight loss and the use of metoprolol, her palpitations have greatly improved.       On 2/2/2019 she presented for urgent follow up.  She called earlier that week complaining of worsening palpitations.  She had been undergoing work up for abdominal discomfort including a CT of the abdomen/pelvis last week and endoscopy the day prior.  She reports she has been on a liquid diet on and off for her work up.   Following her CT she presented to the emergency room with increased palpitations.  Her work up was unremarkable except for hypokalemia.      She returned in 3/2019 for preoperative evaluation.  She is to see Dr. Rosenberg for cholelithiasis and a possible lap cholecystectomy.  It was recommended that she get cardiac clearance due to the note made by the radiologist on her CT of the abdomen and pelvis that indicated that her pulmonary artery appear to be enlarged and could be a sign of pulmonary hypertension.    At that visit I set her up for an echocardiogram which was performed on 3/7/2019 and showed normal left ventricular systolic function and wall motion with an EF of 52%, normal  right ventricular size and function, and a right ventricular systolic pressure of 30 mmHg.  Based on those results I cleared the patient to proceed with surgery.    It appears that she ended up not undergoing surgery.  At some point she was admitted back to the hospital for abdominal pain and reports that she was evaluated again by Dr. Bonilla, Dr. Curry, and this time Dr. Menchaca.  She underwent a colonoscopy during that admission.  She tells me that Dr. Menchaca did not feel that a cholecystectomy was necessary at that time.  She reports that she has follow-up with him later this month.  She has been having increasing issues with lower extremity edema.  She continues to take her furosemide on a daily basis and potassium chloride 10 mEq twice a day.  She reports that she has been to the hospital emergency room several times in the last few months for recurrent pains primarily in her abdomen.  She is frustrated feeling like she is not being told what is going on.  Her last ER visit was earlier this week to the [facility] where her lower extremity edema was evaluated with a lower extremity Doppler ultrasound which she reports was negative for DVT.  She reports that her potassium was normal during that ER visit.  She denies any dyspnea on exertion.  She does report shortness of breath but primarily when she is feeling anxious.  She reports some brief episodes of sharp chest pain in across her chest, and in her flanks that only last about 10 to 15 seconds.  Ports that her swelling is gone to the point that she had to order new shoes.      Review of Systems   Constitution: Negative for weakness and malaise/fatigue.   HENT: Negative for hearing loss, hoarse voice, nosebleeds and sore throat.    Eyes: Negative for pain.   Cardiovascular: Positive for chest pain, leg swelling and palpitations. Negative for claudication, cyanosis, dyspnea on exertion, irregular heartbeat, near-syncope, orthopnea, paroxysmal  nocturnal dyspnea and syncope.   Respiratory: Positive for shortness of breath. Negative for snoring.    Endocrine: Negative for cold intolerance, heat intolerance, polydipsia, polyphagia and polyuria.   Skin: Negative for itching and rash.   Musculoskeletal: Negative for arthritis, falls, joint pain, joint swelling, muscle cramps, muscle weakness and myalgias.   Gastrointestinal: Positive for abdominal pain. Negative for constipation, diarrhea, dysphagia, heartburn, hematemesis, hematochezia, melena, nausea and vomiting.   Genitourinary: Negative for frequency, hematuria and hesitancy.   Neurological: Negative for excessive daytime sleepiness, dizziness, headaches, light-headedness and numbness.   Psychiatric/Behavioral: Negative for depression. The patient is nervous/anxious.           Current Outpatient Medications:   •  ACCU-CHEK JIMMY PLUS test strip, 2 (Two) Times a Day. for testing, Disp: , Rfl: 2  •  Calcium Carbonate (CALCIUM 600 PO), Take  by mouth., Disp: , Rfl:   •  Cholecalciferol (VITAMIN D3) 5000 UNITS capsule capsule, Take 5,000 Units by mouth 2 (Two) Times a Day., Disp: , Rfl:   •  doxycycline (VIBRAMYCIN) 100 MG capsule, Take  by mouth every 12 (twelve) hours., Disp: , Rfl:   •  furosemide (LASIX) 20 MG tablet, TAKE 1 TABLET BY MOUTH TWO  TIMES DAILY, Disp: 180 tablet, Rfl: 2  •  levothyroxine (SYNTHROID) 175 MCG tablet, Take  by mouth. Patietn takes one in the morning and 1/2 every night except for Monday and Thursday, Disp: , Rfl:   •  Magnesium 250 MG tablet, Take  by mouth Daily., Disp: , Rfl:   •  metoprolol tartrate (LOPRESSOR) 25 MG tablet, TAKE 1/2 TABLET BY MOUTH THREE TIMES DAILY, Disp: 135 tablet, Rfl: 0  •  Multiple Vitamin (MULTI-VITAMIN PO), Take  by mouth Daily., Disp: , Rfl:   •  potassium chloride (MICRO-K) 10 MEQ CR capsule, Take 1 capsule by mouth Daily., Disp: 90 capsule, Rfl: 3  •  zolpidem (AMBIEN) 10 MG tablet, Take  by mouth every night at bedtime., Disp: , Rfl:     Past  "Medical History:   Diagnosis Date   • APC (adenomatous polyposis coli)    • Atrial premature complex    • Diabetes mellitus (CMS/HCC)    • Fatigue    • Hypertension    • Hypothyroidism    • Limb swelling    • MVP (mitral valve prolapse)    • PAF (paroxysmal atrial fibrillation) (CMS/HCC)    • Palpitation    • SOB (shortness of breath)      Past Surgical History:   Procedure Laterality Date   • HYSTERECTOMY     • KNEE SURGERY       Family History   Problem Relation Age of Onset   • Atrial fibrillation Mother    • Diabetes Other    • Stroke Neg Hx    • Hypertension Neg Hx      Social History     Tobacco Use   • Smoking status: Never Smoker   • Smokeless tobacco: Never Used   • Tobacco comment: caffeine use   Substance Use Topics   • Alcohol use: Yes     Comment: Social   • Drug use: Not on file           ECG 12 Lead  Date/Time: 6/7/2019 10:37 AM  Performed by: Karen Jacobsen MD  Authorized by: Karen Jacobsen MD   Comparison: compared with previous ECG   Comparison to previous ECG: PVC is new  Rhythm: sinus rhythm  Ectopy: unifocal PVCs  Conduction: right bundle 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 She appears well-developed and well-nourished.   HENT:   Head: Normocephalic and atraumatic.   Eyes: Conjunctivae, EOM and lids are normal. Pupils are equal, round, and reactive to light.   Neck: Normal range of motion and full passive range of motion without pain. Neck supple. No JVD present. Carotid bruit is not present.   Cardiovascular: Normal rate, regular rhythm, S1 normal and S2 normal. Exam reveals no gallop.   No murmur heard.  Pulses:       Radial pulses are 2+ on the right side, and 2+ on the left side.   3+ bilateral lower extremity edema   Pulmonary/Chest: Effort normal and breath sounds " normal.   Abdominal: Soft. Normal appearance.   Lymphadenopathy:     She has no cervical adenopathy.   Neurological: She is alert and oriented to person, place, and time.   Skin: Skin is warm, dry and intact.   Psychiatric: She has a normal mood and affect.       Lab Review:       Assessment:          Diagnosis Plan   1. Atrial complex, premature  ECG 12 Lead   2. Essential hypertension     3. Limb swelling     4. PVC (premature ventricular contraction)            Plan:       1.  Lower extremity edema.  I suspect this is due to chronic venous insufficiency since she had a normal appearing echocardiogram earlier this year.  She is only on a low-dose of furosemide and I recommended that she increase this to 40 mg a day.  She is to increase her potassium chloride to 20 mEq twice a day with this increase.  She is to continue this for a week and if her symptoms improve she can back off to her usual dose of furosemide and potassium chloride.  2.  Abdominal pain.  She is being managed closely by Dr. Menchaca encouraged her to wait for her follow-up appointment with him later this month to go her her findings and discuss any potential diagnosis.  3.  Atypical chest pain.  Her symptoms are brief and atypical.  I am not concerned for a cardiac cause of her symptoms.  4.  Hypertension.  Well controlled on her current medications.  5.  PACs/PVCs.  Continue management with metoprolol and potassium supplementation.  6.  Anxiety.    We will plan on seeing the patient back again in 2 months.

## 2019-09-24 RX ORDER — POTASSIUM CHLORIDE 750 MG/1
TABLET, FILM COATED, EXTENDED RELEASE ORAL
Refills: 0 | COMMUNITY
Start: 2019-07-12

## 2019-09-24 RX ORDER — HYOSCYAMINE SULFATE EXTENDED-RELEASE 0.38 MG/1
TABLET ORAL
Refills: 0 | COMMUNITY
Start: 2019-07-24

## 2019-12-03 RX ORDER — INFLUENZA A VIRUS A/MICHIGAN/45/2015 X-275 (H1N1) ANTIGEN (FORMALDEHYDE INACTIVATED), INFLUENZA A VIRUS A/SINGAPORE/INFIMH-16-0019/2016 IVR-186 (H3N2) ANTIGEN (FORMALDEHYDE INACTIVATED), AND INFLUENZA B VIRUS B/MARYLAND/15/2016 BX-69A (A B/COLORADO/6/2017-LIKE VIRUS) ANTIGEN (FORMALDEHYDE INACTIVATED) 60; 60; 60 UG/.5ML; UG/.5ML; UG/.5ML
INJECTION, SUSPENSION INTRAMUSCULAR
Refills: 0 | COMMUNITY
Start: 2019-09-03

## 2019-12-06 ENCOUNTER — OFFICE VISIT (OUTPATIENT)
Dept: CARDIOLOGY | Facility: CLINIC | Age: 79
End: 2019-12-06

## 2019-12-06 VITALS — DIASTOLIC BLOOD PRESSURE: 90 MMHG | SYSTOLIC BLOOD PRESSURE: 148 MMHG | HEART RATE: 64 BPM

## 2019-12-06 DIAGNOSIS — I49.3 PVC (PREMATURE VENTRICULAR CONTRACTION): ICD-10-CM

## 2019-12-06 DIAGNOSIS — I10 ESSENTIAL HYPERTENSION: ICD-10-CM

## 2019-12-06 DIAGNOSIS — E03.9 HYPOTHYROIDISM, UNSPECIFIED TYPE: ICD-10-CM

## 2019-12-06 DIAGNOSIS — I49.1 ATRIAL COMPLEX, PREMATURE: Primary | ICD-10-CM

## 2019-12-06 PROCEDURE — 93000 ELECTROCARDIOGRAM COMPLETE: CPT | Performed by: INTERNAL MEDICINE

## 2019-12-06 PROCEDURE — 99214 OFFICE O/P EST MOD 30 MIN: CPT | Performed by: INTERNAL MEDICINE

## 2019-12-06 NOTE — PROGRESS NOTES
Subjective:     Encounter Date:12/06/2019      Patient ID: Pearl Desouza is a 79 y.o. female.    Chief Complaint:  History of Present Illness    This is a 78-year-old female with a history of hypertension, hypothyroidism, frequent symptomatic PACs, who presents for followup.       Today she presents for routine six-month follow-up.  Since I saw her last she reports that she switched care to JOHNSON Nicholson.  She has since been placed on a bowel regimen including high-fiber diet and probiotics and her abdominal symptoms have resolved.  She now feels really well.  She denies any palpitations, chest pain, shortness of breath, PND orthopnea, near-syncope or syncope.  She is back to her routine activities and doing what she enjoys including cooking.    Prior History:  I saw the patient initially for an evaluation of palpitations.  A Holter monitor was placed which revealed episodes of sinus tachycardia with frequent PACs but no atrial fibrillation.  She underwent an echocardiogram which revealed normal left ventricular systolic function and wall motion, mild aortic insufficiency and mild tricuspid regurgitation.  We have been managing her palpitations and PACs with metoprolol which has helped greatly with her symptoms.  She has also been on chronic furosemide and potassium for her issues of lower extremity edema.  With weight loss and the use of metoprolol, her palpitations have greatly improved.       On 2/2/2019 she presented for urgent follow up.  She called earlier that week complaining of worsening palpitations.  She had been undergoing work up for abdominal discomfort including a CT of the abdomen/pelvis last week and endoscopy the day prior.  She reports she has been on a liquid diet on and off for her work up.   Following her CT she presented to the emergency room with increased palpitations.  Her work up was unremarkable except for hypokalemia.       She returned in 3/2019 for preoperative  evaluation.  She is to see Dr. Rosenberg for cholelithiasis and a possible lap cholecystectomy.  It was recommended that she get cardiac clearance due to the note made by the radiologist on her CT of the abdomen and pelvis that indicated that her pulmonary artery appear to be enlarged and could be a sign of pulmonary hypertension.    At that visit I set her up for an echocardiogram which was performed on 3/7/2019 and showed normal left ventricular systolic function and wall motion with an EF of 52%, normal right ventricular size and function, and a right ventricular systolic pressure of 30 mmHg.  Based on those results I cleared the patient to proceed with surgery.     It appears that she ended up not undergoing surgery.  At some point she was admitted back to the hospital for abdominal pain and reports that she was evaluated again by Dr. Rosenberg, Dr. Curry, and this time Dr. Menchaca.  She underwent a colonoscopy during that admission.  She tells me that Dr. Menchaca did not feel that a cholecystectomy was necessary at that time.  At her last office visit in 6/2019 she still reported issues with abdominal pain and was frustrated by her ongoing symptoms.     Review of Systems   Constitution: Negative for weakness and malaise/fatigue.   HENT: Negative for hearing loss, hoarse voice, nosebleeds and sore throat.    Eyes: Negative for pain.   Cardiovascular: Negative for chest pain, claudication, cyanosis, dyspnea on exertion, irregular heartbeat, leg swelling, near-syncope, orthopnea, palpitations, paroxysmal nocturnal dyspnea and syncope.   Respiratory: Negative for shortness of breath and snoring.    Endocrine: Negative for cold intolerance, heat intolerance, polydipsia, polyphagia and polyuria.   Skin: Negative for itching and rash.   Musculoskeletal: Negative for arthritis, falls, joint pain, joint swelling, muscle cramps, muscle weakness and myalgias.   Gastrointestinal: Negative for constipation, diarrhea, dysphagia,  heartburn, hematemesis, hematochezia, melena, nausea and vomiting.   Genitourinary: Negative for frequency, hematuria and hesitancy.   Neurological: Negative for excessive daytime sleepiness, dizziness, headaches, light-headedness and numbness.   Psychiatric/Behavioral: Negative for depression. The patient is not nervous/anxious.          Current Outpatient Medications:   •  ACCU-CHEK IJMMY PLUS test strip, 2 (Two) Times a Day. for testing, Disp: , Rfl: 2  •  Calcium Carbonate (CALCIUM 600 PO), Take  by mouth., Disp: , Rfl:   •  Cholecalciferol (VITAMIN D3) 5000 UNITS capsule capsule, Take 5,000 Units by mouth 2 (Two) Times a Day., Disp: , Rfl:   •  doxycycline (VIBRAMYCIN) 100 MG capsule, Take  by mouth every 12 (twelve) hours., Disp: , Rfl:   •  FLUZONE HIGH-DOSE 0.5 ML suspension prefilled syringe injection, ADM 0.5ML IM UTD, Disp: , Rfl: 0  •  furosemide (LASIX) 20 MG tablet, TAKE 1 TABLET BY MOUTH TWO  TIMES DAILY, Disp: 180 tablet, Rfl: 2  •  hyoscyamine (LEVBID) 0.375 MG 12 hr tablet, TK 1 T PO QAM, Disp: , Rfl: 0  •  levothyroxine (SYNTHROID) 175 MCG tablet, Take  by mouth. Patietn takes one in the morning and 1/2 every night except for Monday and Thursday, Disp: , Rfl:   •  Magnesium 250 MG tablet, Take  by mouth Daily., Disp: , Rfl:   •  metoprolol tartrate (LOPRESSOR) 25 MG tablet, TAKE 1/2 TABLET BY MOUTH THREE TIMES DAILY, Disp: 135 tablet, Rfl: 1  •  Multiple Vitamin (MULTI-VITAMIN PO), Take  by mouth Daily., Disp: , Rfl:   •  potassium chloride (K-DUR) 10 MEQ CR tablet, TK 2 TS PO D, Disp: , Rfl: 0  •  potassium chloride (MICRO-K) 10 MEQ CR capsule, Take 1 capsule by mouth Daily., Disp: 90 capsule, Rfl: 3  •  zolpidem (AMBIEN) 10 MG tablet, Take  by mouth every night at bedtime., Disp: , Rfl:     Past Medical History:   Diagnosis Date   • APC (adenomatous polyposis coli)    • Atrial premature complex    • Diabetes mellitus (CMS/HCC)    • Fatigue    • Hypertension    • Hypothyroidism    • Limb swelling     • MVP (mitral valve prolapse)    • PAF (paroxysmal atrial fibrillation) (CMS/HCC)    • Palpitation    • SOB (shortness of breath)        Past Surgical History:   Procedure Laterality Date   • HYSTERECTOMY     • KNEE SURGERY         Family History   Problem Relation Age of Onset   • Atrial fibrillation Mother    • Diabetes Other    • Stroke Neg Hx    • Hypertension Neg Hx        Social History     Tobacco Use   • Smoking status: Never Smoker   • Smokeless tobacco: Never Used   • Tobacco comment: caffeine use   Substance Use Topics   • Alcohol use: Yes     Comment: Social   • Drug use: Not on file         ECG 12 Lead  Date/Time: 12/6/2019 11:34 AM  Performed by: Karen Jacobsen MD  Authorized by: Karen Jacobsen MD   Comparison: compared with previous ECG   Similar to previous ECG  Rhythm: sinus rhythm  Conduction: right bundle branch block and left anterior fascicular block               Objective:     Visit Vitals  /90   Pulse 64         Physical Exam   Constitutional: She is oriented to person, place, and time. She appears well-developed and well-nourished.   HENT:   Head: Normocephalic and atraumatic.   Eyes: Conjunctivae, EOM and lids are normal. Pupils are equal, round, and reactive to light.   Neck: Normal range of motion and full passive range of motion without pain. Neck supple. No JVD present. Carotid bruit is not present.   Cardiovascular: Normal rate, regular rhythm, S1 normal and S2 normal. Exam reveals no gallop.   No murmur heard.  Pulses:       Radial pulses are 2+ on the right side, and 2+ on the left side.   No bilateral lower extremity edema   Pulmonary/Chest: Effort normal and breath sounds normal.   Abdominal: Soft. Normal appearance.   Lymphadenopathy:     She has no cervical adenopathy.   Neurological: She is alert and oriented to person, place, and time.   Skin: Skin is warm, dry and intact.   Psychiatric: She has a normal mood and affect.       Lab Review:       Assessment:           Diagnosis Plan   1. Atrial complex, premature     2. PVC (premature ventricular contraction)     3. Essential hypertension     4. Hypothyroidism, unspecified type            Plan:       1.  Hypertension.  Elevated in the office but normally well controlled.  Continue current management.  2.  PACs/PVCs.  Well controlled with metoprolol.    We will plan on seeing the patient back again in 1 year or sooner if further issues arise.

## 2020-01-01 ENCOUNTER — OFFICE VISIT (OUTPATIENT)
Dept: CARDIOLOGY | Facility: CLINIC | Age: 80
End: 2020-01-01

## 2020-01-01 VITALS
BODY MASS INDEX: 33.27 KG/M2 | HEART RATE: 58 BPM | HEIGHT: 66 IN | WEIGHT: 207 LBS | SYSTOLIC BLOOD PRESSURE: 150 MMHG | DIASTOLIC BLOOD PRESSURE: 82 MMHG

## 2020-01-01 DIAGNOSIS — I10 ESSENTIAL HYPERTENSION: ICD-10-CM

## 2020-01-01 DIAGNOSIS — I49.3 PVC (PREMATURE VENTRICULAR CONTRACTION): ICD-10-CM

## 2020-01-01 DIAGNOSIS — I49.1 ATRIAL COMPLEX, PREMATURE: Primary | ICD-10-CM

## 2020-01-01 PROCEDURE — 99214 OFFICE O/P EST MOD 30 MIN: CPT | Performed by: INTERNAL MEDICINE

## 2020-01-01 PROCEDURE — 93000 ELECTROCARDIOGRAM COMPLETE: CPT | Performed by: INTERNAL MEDICINE

## 2020-01-01 RX ORDER — NITROFURANTOIN 25; 75 MG/1; MG/1
100 CAPSULE ORAL 2 TIMES DAILY
COMMUNITY
Start: 2020-01-01

## 2020-01-01 RX ORDER — LOPERAMIDE HYDROCHLORIDE 2 MG/1
CAPSULE ORAL
COMMUNITY
Start: 2020-01-01

## 2020-01-01 RX ORDER — FLUOXETINE HYDROCHLORIDE 20 MG/1
CAPSULE ORAL
COMMUNITY
Start: 2020-10-13

## 2020-12-18 NOTE — PROGRESS NOTES
Subjective:     Encounter Date:12/18/2020      Patient ID: Pearl Desouza is a 80 y.o. female.    Chief Complaint:  History of Present Illness       This is a 80-year-old female with a history of hypertension, hypothyroidism, frequent symptomatic PACs, who presents for followup.       He presents today for routine annual follow-up.  She is doing well from a cardiac standpoint.  No chest pain, shortness of breath, palpitations, orthopnea, near-syncope or syncope, or significant lower extremity edema.  Her main complaints today are noncardiac.  She reports that he had some issues with her thyroid medication which resulted in some weight gain.  She also reports that she has been having issues with a UTI that she is having trouble getting rid of.     Prior History:  I saw the patient initially for an evaluation of palpitations.  A Holter monitor was placed which revealed episodes of sinus tachycardia with frequent PACs but no atrial fibrillation.  She underwent an echocardiogram which revealed normal left ventricular systolic function and wall motion, mild aortic insufficiency and mild tricuspid regurgitation.  We have been managing her palpitations and PACs with metoprolol which has helped greatly with her symptoms.  She has also been on chronic furosemide and potassium for her issues of lower extremity edema.  With weight loss and the use of metoprolol, her palpitations have greatly improved.       On 2/2/2019 she presented for urgent follow up.  She called earlier that week complaining of worsening palpitations.  She had been undergoing work up for abdominal discomfort including a CT of the abdomen/pelvis last week and endoscopy the day prior.  She reports she has been on a liquid diet on and off for her work up.   Following her CT she presented to the emergency room with increased palpitations.  Her work up was unremarkable except for hypokalemia.       She returned in 3/2019 for preoperative evaluation.   She is to see Dr. Rosenberg for cholelithiasis and a possible lap cholecystectomy.  It was recommended that she get cardiac clearance due to the note made by the radiologist on her CT of the abdomen and pelvis that indicated that her pulmonary artery appear to be enlarged and could be a sign of pulmonary hypertension.    At that visit I set her up for an echocardiogram which was performed on 3/7/2019 and showed normal left ventricular systolic function and wall motion with an EF of 52%, normal right ventricular size and function, and a right ventricular systolic pressure of 30 mmHg.  Based on those results I cleared the patient to proceed with surgery.     It appears that she ended up not undergoing surgery.  At some point she was admitted back to the hospital for abdominal pain and reports that she was evaluated again by Dr. Rosenberg, Dr. Curry, and this time Dr. Menchaca.  She underwent a colonoscopy during that admission.  She tells me that Dr. Menchaca did not feel that a cholecystectomy was necessary at that time.  At her last office visit in 6/2019 she still reported issues with abdominal pain and was frustrated by her ongoing symptoms.   She was eventually placed on a high fiber diet and her symptoms improved.    Review of Systems   Constitution: Positive for malaise/fatigue and weight gain.   HENT: Negative for hearing loss, hoarse voice, nosebleeds and sore throat.    Eyes: Negative for pain.   Cardiovascular: Negative for chest pain, claudication, cyanosis, dyspnea on exertion, irregular heartbeat, leg swelling, near-syncope, orthopnea, palpitations, paroxysmal nocturnal dyspnea and syncope.   Respiratory: Negative for shortness of breath and snoring.    Endocrine: Negative for cold intolerance, heat intolerance, polydipsia, polyphagia and polyuria.   Skin: Negative for itching and rash.   Musculoskeletal: Negative for arthritis, falls, joint pain, joint swelling, muscle cramps, muscle weakness and myalgias.    Gastrointestinal: Negative for constipation, diarrhea, dysphagia, heartburn, hematemesis, hematochezia, melena, nausea and vomiting.   Genitourinary: Negative for frequency, hematuria and hesitancy.   Neurological: Negative for excessive daytime sleepiness, dizziness, headaches, light-headedness, numbness and weakness.   Psychiatric/Behavioral: Negative for depression. The patient is not nervous/anxious.          Current Outpatient Medications:   •  ACCU-CHEK JIMMY PLUS test strip, 2 (Two) Times a Day. for testing, Disp: , Rfl: 2  •  Calcium Carbonate (CALCIUM 600 PO), Take  by mouth., Disp: , Rfl:   •  Cholecalciferol (VITAMIN D3) 5000 UNITS capsule capsule, Take 5,000 Units by mouth 2 (Two) Times a Day., Disp: , Rfl:   •  doxycycline (VIBRAMYCIN) 100 MG capsule, Take  by mouth every 12 (twelve) hours., Disp: , Rfl:   •  FLUoxetine (PROzac) 20 MG capsule, TK 1 C PO D, Disp: , Rfl:   •  FLUZONE HIGH-DOSE 0.5 ML suspension prefilled syringe injection, ADM 0.5ML IM UTD, Disp: , Rfl: 0  •  furosemide (LASIX) 20 MG tablet, TAKE 1 TABLET BY MOUTH TWO  TIMES DAILY, Disp: 180 tablet, Rfl: 2  •  hyoscyamine (LEVBID) 0.375 MG 12 hr tablet, TK 1 T PO QAM, Disp: , Rfl: 0  •  levothyroxine (SYNTHROID) 175 MCG tablet, Take  by mouth. Patietn takes one in the morning and 1/2 every night except for Monday and Thursday, Disp: , Rfl:   •  loperamide (IMODIUM) 2 MG capsule, , Disp: , Rfl:   •  Magnesium 250 MG tablet, Take  by mouth Daily., Disp: , Rfl:   •  metoprolol tartrate (LOPRESSOR) 25 MG tablet, TAKE 1/2 TABLET BY MOUTH THREE TIMES DAILY, Disp: 135 tablet, Rfl: 1  •  Multiple Vitamin (MULTI-VITAMIN PO), Take  by mouth Daily., Disp: , Rfl:   •  nitrofurantoin, macrocrystal-monohydrate, (MACROBID) 100 MG capsule, Take 100 mg by mouth 2 (Two) Times a Day., Disp: , Rfl:   •  potassium chloride (K-DUR) 10 MEQ CR tablet, TK 2 TS PO D, Disp: , Rfl: 0  •  potassium chloride (MICRO-K) 10 MEQ CR capsule, Take 1 capsule by mouth  "Daily., Disp: 90 capsule, Rfl: 3  •  zolpidem (AMBIEN) 10 MG tablet, Take  by mouth every night at bedtime., Disp: , Rfl:     Past Medical History:   Diagnosis Date   • APC (adenomatous polyposis coli)    • Atrial premature complex    • Diabetes mellitus (CMS/HCC)    • Fatigue    • Hypertension    • Hypothyroidism    • Limb swelling    • MVP (mitral valve prolapse)    • PAF (paroxysmal atrial fibrillation) (CMS/HCC)    • Palpitation    • SOB (shortness of breath)        Past Surgical History:   Procedure Laterality Date   • HYSTERECTOMY     • KNEE SURGERY         Family History   Problem Relation Age of Onset   • Atrial fibrillation Mother    • Diabetes Other    • Stroke Neg Hx    • Hypertension Neg Hx        Social History     Tobacco Use   • Smoking status: Never Smoker   • Smokeless tobacco: Never Used   • Tobacco comment: caffeine use   Substance Use Topics   • Alcohol use: Yes     Comment: Social   • Drug use: Not on file         ECG 12 Lead    Date/Time: 12/18/2020 9:14 AM  Performed by: Karen Jacobsen MD  Authorized by: Karen Jacobsen MD   Comparison: compared with previous ECG   Similar to previous ECG  Rhythm: sinus rhythm  Conduction: right bundle branch block and left anterior fascicular block               Objective:     Visit Vitals  /82   Pulse 58   Ht 167.6 cm (66\")   Wt 93.9 kg (207 lb)   BMI 33.41 kg/m²         Constitutional:       Appearance: Normal appearance. Well-developed.   Eyes:      General: Lids are normal.      Conjunctiva/sclera: Conjunctivae normal.      Pupils: Pupils are equal, round, and reactive to light.   HENT:      Head: Normocephalic and atraumatic.   Neck:      Musculoskeletal: Full passive range of motion without pain, normal range of motion and neck supple.      Vascular: No carotid bruit or JVD.      Lymphadenopathy: No cervical adenopathy.   Pulmonary:      Effort: Pulmonary effort is normal.      Breath sounds: Normal breath sounds.   Cardiovascular:      Normal " rate. Regular rhythm.      No gallop.   Pulses:     Radial: 2+ bilaterally.  Edema:     Peripheral edema absent.   Abdominal:      Palpations: Abdomen is soft.   Skin:     General: Skin is warm and dry.   Neurological:      Mental Status: Alert and oriented to person, place, and time.             Assessment:          Diagnosis Plan   1. Atrial complex, premature     2. Essential hypertension     3. PVC (premature ventricular contraction)            Plan:       1.  PACs/PVCs.  None present on her EKG today.  No recent significant symptoms.  Continue metoprolol.  2.  Hypertension.  Elevated in the office today but she reports that her blood pressures are well controlled at home.  We will continue current medical management.    We will plan on seeing the patient back again in 1 year or sooner if further issues arise.